# Patient Record
Sex: FEMALE | Race: WHITE | NOT HISPANIC OR LATINO | Employment: FULL TIME | URBAN - METROPOLITAN AREA
[De-identification: names, ages, dates, MRNs, and addresses within clinical notes are randomized per-mention and may not be internally consistent; named-entity substitution may affect disease eponyms.]

---

## 2022-04-01 ENCOUNTER — HOSPITAL ENCOUNTER (OUTPATIENT)
Facility: HOSPITAL | Age: 32
Setting detail: OBSERVATION
Discharge: HOME | End: 2022-04-02
Attending: EMERGENCY MEDICINE | Admitting: EMERGENCY MEDICINE
Payer: COMMERCIAL

## 2022-04-01 DIAGNOSIS — R00.0 SINUS TACHYCARDIA: ICD-10-CM

## 2022-04-01 DIAGNOSIS — F10.20 ALCOHOL USE DISORDER, SEVERE, DEPENDENCE (H): Primary | ICD-10-CM

## 2022-04-01 DIAGNOSIS — F10.930 ALCOHOL WITHDRAWAL SYNDROME WITHOUT COMPLICATION (H): ICD-10-CM

## 2022-04-01 PROBLEM — F41.9 ANXIETY AND DEPRESSION: Status: ACTIVE | Noted: 2022-01-20

## 2022-04-01 PROBLEM — F10.11 HISTORY OF ALCOHOL ABUSE: Status: ACTIVE | Noted: 2021-10-02

## 2022-04-01 PROBLEM — E83.42 HYPOMAGNESEMIA: Status: ACTIVE | Noted: 2021-01-29

## 2022-04-01 PROBLEM — K76.89 HEPATIC DYSFUNCTION: Status: ACTIVE | Noted: 2022-04-01

## 2022-04-01 PROBLEM — F10.931 ALCOHOL WITHDRAWAL DELIRIUM, ACUTE, HYPERACTIVE (H): Status: ACTIVE | Noted: 2021-01-29

## 2022-04-01 PROBLEM — F17.200 TOBACCO USE DISORDER: Status: ACTIVE | Noted: 2022-04-01

## 2022-04-01 PROBLEM — F32.A ANXIETY AND DEPRESSION: Status: ACTIVE | Noted: 2022-01-20

## 2022-04-01 PROBLEM — F32.A DEPRESSION: Status: ACTIVE | Noted: 2021-01-29

## 2022-04-01 PROBLEM — E87.1 HYPONATREMIA: Status: ACTIVE | Noted: 2022-01-20

## 2022-04-01 PROBLEM — F10.932 ALCOHOL WITHDRAWAL SYNDROME WITH PERCEPTUAL DISTURBANCE (H): Status: ACTIVE | Noted: 2022-02-23

## 2022-04-01 PROBLEM — E87.20 METABOLIC ACIDOSIS: Status: ACTIVE | Noted: 2022-01-20

## 2022-04-01 PROBLEM — E86.0 DEHYDRATION: Status: ACTIVE | Noted: 2021-01-02

## 2022-04-01 PROBLEM — F10.239 ALCOHOL DEPENDENCE WITH WITHDRAWAL, UNSPECIFIED (H): Status: ACTIVE | Noted: 2020-10-14

## 2022-04-01 PROBLEM — F39 MOOD DISORDER (H): Status: ACTIVE | Noted: 2021-01-29

## 2022-04-01 LAB
ALBUMIN SERPL-MCNC: 4.5 G/DL (ref 3.5–5)
ALP SERPL-CCNC: 72 U/L (ref 45–120)
ALT SERPL W P-5'-P-CCNC: 16 U/L (ref 0–45)
ANION GAP SERPL CALCULATED.3IONS-SCNC: 17 MMOL/L (ref 5–18)
AST SERPL W P-5'-P-CCNC: 41 U/L (ref 0–40)
ATRIAL RATE - MUSE: 110 BPM
BILIRUB SERPL-MCNC: 1.9 MG/DL (ref 0–1)
BUN SERPL-MCNC: 9 MG/DL (ref 8–22)
CALCIUM SERPL-MCNC: 9.4 MG/DL (ref 8.5–10.5)
CHLORIDE BLD-SCNC: 102 MMOL/L (ref 98–107)
CO2 SERPL-SCNC: 21 MMOL/L (ref 22–31)
CREAT SERPL-MCNC: 0.74 MG/DL (ref 0.6–1.1)
DIASTOLIC BLOOD PRESSURE - MUSE: NORMAL MMHG
ERYTHROCYTE [DISTWIDTH] IN BLOOD BY AUTOMATED COUNT: 12.6 % (ref 10–15)
GFR SERPL CREATININE-BSD FRML MDRD: >90 ML/MIN/1.73M2
GLUCOSE BLD-MCNC: 88 MG/DL (ref 70–125)
HCT VFR BLD AUTO: 41.8 % (ref 35–47)
HGB BLD-MCNC: 14.4 G/DL (ref 11.7–15.7)
HOLD SPECIMEN: NORMAL
HOLD SPECIMEN: NORMAL
INTERPRETATION ECG - MUSE: NORMAL
LIPASE SERPL-CCNC: 40 U/L (ref 0–52)
MAGNESIUM SERPL-MCNC: 1.5 MG/DL (ref 1.8–2.6)
MCH RBC QN AUTO: 31.9 PG (ref 26.5–33)
MCHC RBC AUTO-ENTMCNC: 34.4 G/DL (ref 31.5–36.5)
MCV RBC AUTO: 93 FL (ref 78–100)
P AXIS - MUSE: 74 DEGREES
PHOSPHATE SERPL-MCNC: 1.5 MG/DL (ref 2.5–4.5)
PLATELET # BLD AUTO: 299 10E3/UL (ref 150–450)
POTASSIUM BLD-SCNC: 3.9 MMOL/L (ref 3.5–5)
PR INTERVAL - MUSE: 128 MS
PROT SERPL-MCNC: 7.6 G/DL (ref 6–8)
QRS DURATION - MUSE: 88 MS
QT - MUSE: 362 MS
QTC - MUSE: 489 MS
R AXIS - MUSE: 90 DEGREES
RBC # BLD AUTO: 4.52 10E6/UL (ref 3.8–5.2)
SARS-COV-2 RNA RESP QL NAA+PROBE: NEGATIVE
SODIUM SERPL-SCNC: 140 MMOL/L (ref 136–145)
SYSTOLIC BLOOD PRESSURE - MUSE: NORMAL MMHG
T AXIS - MUSE: 67 DEGREES
VENTRICULAR RATE- MUSE: 110 BPM
WBC # BLD AUTO: 10.8 10E3/UL (ref 4–11)

## 2022-04-01 PROCEDURE — G0378 HOSPITAL OBSERVATION PER HR: HCPCS

## 2022-04-01 PROCEDURE — 96361 HYDRATE IV INFUSION ADD-ON: CPT

## 2022-04-01 PROCEDURE — 93005 ELECTROCARDIOGRAM TRACING: CPT | Performed by: PHYSICIAN ASSISTANT

## 2022-04-01 PROCEDURE — 250N000013 HC RX MED GY IP 250 OP 250 PS 637: Performed by: FAMILY MEDICINE

## 2022-04-01 PROCEDURE — 258N000003 HC RX IP 258 OP 636: Performed by: FAMILY MEDICINE

## 2022-04-01 PROCEDURE — 96376 TX/PRO/DX INJ SAME DRUG ADON: CPT

## 2022-04-01 PROCEDURE — 99214 OFFICE O/P EST MOD 30 MIN: CPT | Mod: 95 | Performed by: FAMILY MEDICINE

## 2022-04-01 PROCEDURE — 87635 SARS-COV-2 COVID-19 AMP PRB: CPT | Performed by: PHYSICIAN ASSISTANT

## 2022-04-01 PROCEDURE — 83690 ASSAY OF LIPASE: CPT | Performed by: PHYSICIAN ASSISTANT

## 2022-04-01 PROCEDURE — 258N000003 HC RX IP 258 OP 636: Performed by: PHYSICIAN ASSISTANT

## 2022-04-01 PROCEDURE — 250N000013 HC RX MED GY IP 250 OP 250 PS 637: Performed by: PHYSICIAN ASSISTANT

## 2022-04-01 PROCEDURE — 99220 PR INITIAL OBSERVATION CARE,LEVEL III: CPT | Performed by: FAMILY MEDICINE

## 2022-04-01 PROCEDURE — 96374 THER/PROPH/DIAG INJ IV PUSH: CPT

## 2022-04-01 PROCEDURE — C9803 HOPD COVID-19 SPEC COLLECT: HCPCS

## 2022-04-01 PROCEDURE — 85027 COMPLETE CBC AUTOMATED: CPT | Performed by: PHYSICIAN ASSISTANT

## 2022-04-01 PROCEDURE — 99285 EMERGENCY DEPT VISIT HI MDM: CPT | Mod: 25

## 2022-04-01 PROCEDURE — 36415 COLL VENOUS BLD VENIPUNCTURE: CPT | Performed by: PHYSICIAN ASSISTANT

## 2022-04-01 PROCEDURE — 80053 COMPREHEN METABOLIC PANEL: CPT | Performed by: PHYSICIAN ASSISTANT

## 2022-04-01 PROCEDURE — 250N000011 HC RX IP 250 OP 636: Performed by: PHYSICIAN ASSISTANT

## 2022-04-01 PROCEDURE — 84100 ASSAY OF PHOSPHORUS: CPT | Performed by: FAMILY MEDICINE

## 2022-04-01 PROCEDURE — 83735 ASSAY OF MAGNESIUM: CPT | Performed by: PHYSICIAN ASSISTANT

## 2022-04-01 RX ORDER — LORAZEPAM 2 MG/ML
1-2 INJECTION INTRAMUSCULAR EVERY 30 MIN PRN
Status: DISCONTINUED | OUTPATIENT
Start: 2022-04-01 | End: 2022-04-01

## 2022-04-01 RX ORDER — ONDANSETRON 2 MG/ML
4 INJECTION INTRAMUSCULAR; INTRAVENOUS EVERY 6 HOURS PRN
Status: DISCONTINUED | OUTPATIENT
Start: 2022-04-01 | End: 2022-04-02 | Stop reason: HOSPADM

## 2022-04-01 RX ORDER — NALTREXONE HYDROCHLORIDE 50 MG/1
50 TABLET, FILM COATED ORAL DAILY
Qty: 30 TABLET | Refills: 0 | Status: SHIPPED | OUTPATIENT
Start: 2022-04-01

## 2022-04-01 RX ORDER — AMOXICILLIN 250 MG
2 CAPSULE ORAL 2 TIMES DAILY PRN
Status: DISCONTINUED | OUTPATIENT
Start: 2022-04-01 | End: 2022-04-02 | Stop reason: HOSPADM

## 2022-04-01 RX ORDER — PROCHLORPERAZINE 25 MG
25 SUPPOSITORY, RECTAL RECTAL EVERY 12 HOURS PRN
Status: DISCONTINUED | OUTPATIENT
Start: 2022-04-01 | End: 2022-04-02 | Stop reason: HOSPADM

## 2022-04-01 RX ORDER — MULTIPLE VITAMINS W/ MINERALS TAB 9MG-400MCG
1 TAB ORAL DAILY
Status: DISCONTINUED | OUTPATIENT
Start: 2022-04-01 | End: 2022-04-01

## 2022-04-01 RX ORDER — GABAPENTIN 300 MG/1
600 CAPSULE ORAL 3 TIMES DAILY
Qty: 180 CAPSULE | Refills: 0 | Status: SHIPPED | OUTPATIENT
Start: 2022-04-01

## 2022-04-01 RX ORDER — GABAPENTIN 300 MG/1
300 CAPSULE ORAL EVERY 8 HOURS
Status: DISCONTINUED | OUTPATIENT
Start: 2022-04-06 | End: 2022-04-01

## 2022-04-01 RX ORDER — HALOPERIDOL 5 MG/ML
2.5-5 INJECTION INTRAMUSCULAR EVERY 6 HOURS PRN
Status: DISCONTINUED | OUTPATIENT
Start: 2022-04-01 | End: 2022-04-02 | Stop reason: HOSPADM

## 2022-04-01 RX ORDER — POLYETHYLENE GLYCOL 3350 17 G/17G
17 POWDER, FOR SOLUTION ORAL DAILY
Status: DISCONTINUED | OUTPATIENT
Start: 2022-04-01 | End: 2022-04-02 | Stop reason: HOSPADM

## 2022-04-01 RX ORDER — AMOXICILLIN 250 MG
1 CAPSULE ORAL 2 TIMES DAILY PRN
Status: DISCONTINUED | OUTPATIENT
Start: 2022-04-01 | End: 2022-04-02 | Stop reason: HOSPADM

## 2022-04-01 RX ORDER — MULTIVITAMIN,THERAPEUTIC
1 TABLET ORAL ONCE
Status: COMPLETED | OUTPATIENT
Start: 2022-04-01 | End: 2022-04-01

## 2022-04-01 RX ORDER — MULTIPLE VITAMINS W/ MINERALS TAB 9MG-400MCG
1 TAB ORAL DAILY
Status: DISCONTINUED | OUTPATIENT
Start: 2022-04-01 | End: 2022-04-02 | Stop reason: HOSPADM

## 2022-04-01 RX ORDER — FLUMAZENIL 0.1 MG/ML
0.2 INJECTION, SOLUTION INTRAVENOUS
Status: DISCONTINUED | OUTPATIENT
Start: 2022-04-01 | End: 2022-04-02 | Stop reason: HOSPADM

## 2022-04-01 RX ORDER — HYDROXYZINE HYDROCHLORIDE 25 MG/1
25 TABLET, FILM COATED ORAL ONCE
Status: COMPLETED | OUTPATIENT
Start: 2022-04-01 | End: 2022-04-01

## 2022-04-01 RX ORDER — HYDROXYZINE PAMOATE 25 MG/1
25 CAPSULE ORAL 3 TIMES DAILY PRN
COMMUNITY

## 2022-04-01 RX ORDER — ACETAMINOPHEN 325 MG/1
650 TABLET ORAL EVERY 6 HOURS PRN
Status: DISCONTINUED | OUTPATIENT
Start: 2022-04-01 | End: 2022-04-02 | Stop reason: HOSPADM

## 2022-04-01 RX ORDER — DIAZEPAM 10 MG/2ML
5-10 INJECTION, SOLUTION INTRAMUSCULAR; INTRAVENOUS EVERY 30 MIN PRN
Status: DISCONTINUED | OUTPATIENT
Start: 2022-04-01 | End: 2022-04-02 | Stop reason: HOSPADM

## 2022-04-01 RX ORDER — LORAZEPAM 2 MG/ML
1 INJECTION INTRAMUSCULAR ONCE
Status: COMPLETED | OUTPATIENT
Start: 2022-04-01 | End: 2022-04-01

## 2022-04-01 RX ORDER — DIAZEPAM 5 MG
10 TABLET ORAL EVERY 30 MIN PRN
Status: DISCONTINUED | OUTPATIENT
Start: 2022-04-01 | End: 2022-04-02 | Stop reason: HOSPADM

## 2022-04-01 RX ORDER — FOLIC ACID 1 MG/1
1 TABLET ORAL DAILY
Status: DISCONTINUED | OUTPATIENT
Start: 2022-04-01 | End: 2022-04-02 | Stop reason: HOSPADM

## 2022-04-01 RX ORDER — LORAZEPAM 2 MG/ML
1 INJECTION INTRAMUSCULAR ONCE
Status: DISCONTINUED | OUTPATIENT
Start: 2022-04-01 | End: 2022-04-01

## 2022-04-01 RX ORDER — ACETAMINOPHEN 650 MG/1
650 SUPPOSITORY RECTAL EVERY 6 HOURS PRN
Status: DISCONTINUED | OUTPATIENT
Start: 2022-04-01 | End: 2022-04-02 | Stop reason: HOSPADM

## 2022-04-01 RX ORDER — MULTIPLE VITAMINS W/ MINERALS TAB 9MG-400MCG
1 TAB ORAL DAILY
COMMUNITY

## 2022-04-01 RX ORDER — HYDROXYZINE PAMOATE 25 MG/1
25 CAPSULE ORAL 3 TIMES DAILY PRN
Status: DISCONTINUED | OUTPATIENT
Start: 2022-04-01 | End: 2022-04-02 | Stop reason: HOSPADM

## 2022-04-01 RX ORDER — IBUPROFEN 600 MG/1
600 TABLET, FILM COATED ORAL EVERY 6 HOURS PRN
Status: DISCONTINUED | OUTPATIENT
Start: 2022-04-01 | End: 2022-04-02 | Stop reason: HOSPADM

## 2022-04-01 RX ORDER — PROCHLORPERAZINE MALEATE 10 MG
10 TABLET ORAL EVERY 6 HOURS PRN
Status: DISCONTINUED | OUTPATIENT
Start: 2022-04-01 | End: 2022-04-02 | Stop reason: HOSPADM

## 2022-04-01 RX ORDER — NALTREXONE HYDROCHLORIDE 50 MG/1
50 TABLET, FILM COATED ORAL DAILY
Status: ON HOLD | COMMUNITY
End: 2022-04-01

## 2022-04-01 RX ORDER — GABAPENTIN 100 MG/1
100 CAPSULE ORAL EVERY 8 HOURS
Status: DISCONTINUED | OUTPATIENT
Start: 2022-04-08 | End: 2022-04-01

## 2022-04-01 RX ORDER — LORAZEPAM 1 MG/1
1-2 TABLET ORAL EVERY 30 MIN PRN
Status: DISCONTINUED | OUTPATIENT
Start: 2022-04-01 | End: 2022-04-01

## 2022-04-01 RX ORDER — ONDANSETRON 4 MG/1
4 TABLET, ORALLY DISINTEGRATING ORAL EVERY 6 HOURS PRN
Status: DISCONTINUED | OUTPATIENT
Start: 2022-04-01 | End: 2022-04-02 | Stop reason: HOSPADM

## 2022-04-01 RX ORDER — MAGNESIUM OXIDE 400 MG/1
800 TABLET ORAL ONCE
Status: COMPLETED | OUTPATIENT
Start: 2022-04-01 | End: 2022-04-01

## 2022-04-01 RX ORDER — OLANZAPINE 5 MG/1
5-10 TABLET, ORALLY DISINTEGRATING ORAL EVERY 6 HOURS PRN
Status: DISCONTINUED | OUTPATIENT
Start: 2022-04-01 | End: 2022-04-02 | Stop reason: HOSPADM

## 2022-04-01 RX ORDER — NALTREXONE HYDROCHLORIDE 50 MG/1
50 TABLET, FILM COATED ORAL DAILY
Status: DISCONTINUED | OUTPATIENT
Start: 2022-04-03 | End: 2022-04-02 | Stop reason: HOSPADM

## 2022-04-01 RX ORDER — SODIUM CHLORIDE 9 MG/ML
INJECTION, SOLUTION INTRAVENOUS CONTINUOUS
Status: ACTIVE | OUTPATIENT
Start: 2022-04-01 | End: 2022-04-02

## 2022-04-01 RX ORDER — ESCITALOPRAM OXALATE 10 MG/1
10 TABLET ORAL DAILY
Status: DISCONTINUED | OUTPATIENT
Start: 2022-04-01 | End: 2022-04-02 | Stop reason: HOSPADM

## 2022-04-01 RX ORDER — GABAPENTIN 300 MG/1
600 CAPSULE ORAL 3 TIMES DAILY
Status: DISCONTINUED | OUTPATIENT
Start: 2022-04-01 | End: 2022-04-02 | Stop reason: HOSPADM

## 2022-04-01 RX ORDER — ESCITALOPRAM OXALATE 10 MG/1
10 TABLET ORAL DAILY
COMMUNITY

## 2022-04-01 RX ORDER — FOLIC ACID 1 MG/1
1 TABLET ORAL ONCE
Status: COMPLETED | OUTPATIENT
Start: 2022-04-01 | End: 2022-04-01

## 2022-04-01 RX ADMIN — MULTIPLE VITAMINS W/ MINERALS TAB 1 TABLET: TAB at 16:44

## 2022-04-01 RX ADMIN — FOLIC ACID 1 MG: 1 TABLET ORAL at 09:23

## 2022-04-01 RX ADMIN — Medication 800 MG: at 09:24

## 2022-04-01 RX ADMIN — HYDROXYZINE HYDROCHLORIDE 25 MG: 25 TABLET ORAL at 09:59

## 2022-04-01 RX ADMIN — THERA TABS 1 TABLET: TAB at 09:24

## 2022-04-01 RX ADMIN — THIAMINE HCL TAB 100 MG 100 MG: 100 TAB at 09:24

## 2022-04-01 RX ADMIN — LORAZEPAM 1 MG: 2 INJECTION INTRAMUSCULAR; INTRAVENOUS at 09:19

## 2022-04-01 RX ADMIN — SODIUM CHLORIDE: 9 INJECTION, SOLUTION INTRAVENOUS at 16:50

## 2022-04-01 RX ADMIN — FOLIC ACID 1 MG: 1 TABLET ORAL at 16:44

## 2022-04-01 RX ADMIN — Medication 100 MG: at 16:44

## 2022-04-01 RX ADMIN — LORAZEPAM 1 MG: 2 INJECTION INTRAMUSCULAR; INTRAVENOUS at 12:22

## 2022-04-01 RX ADMIN — LORAZEPAM 1 MG: 2 INJECTION INTRAMUSCULAR; INTRAVENOUS at 09:57

## 2022-04-01 RX ADMIN — SODIUM CHLORIDE 1000 ML: 9 INJECTION, SOLUTION INTRAVENOUS at 09:22

## 2022-04-01 RX ADMIN — GABAPENTIN 600 MG: 300 CAPSULE ORAL at 21:04

## 2022-04-01 RX ADMIN — SODIUM CHLORIDE 1000 ML: 9 INJECTION, SOLUTION INTRAVENOUS at 10:50

## 2022-04-01 RX ADMIN — ESCITALOPRAM OXALATE 10 MG: 10 TABLET, FILM COATED ORAL at 16:44

## 2022-04-01 ASSESSMENT — ENCOUNTER SYMPTOMS
NAUSEA: 1
VOMITING: 0
NECK PAIN: 0
HEADACHES: 0
WEAKNESS: 0
SHORTNESS OF BREATH: 0
CHILLS: 0
NUMBNESS: 1
ABDOMINAL PAIN: 0
BACK PAIN: 0
DIZZINESS: 0
FEVER: 0
COUGH: 0
TREMORS: 1

## 2022-04-01 ASSESSMENT — PATIENT HEALTH QUESTIONNAIRE - PHQ9: SUM OF ALL RESPONSES TO PHQ9 QUESTIONS 1 & 2: 1

## 2022-04-01 NOTE — ED NOTES
"[facility] ED Handoff Report    ED Chief Complaint: The patient arrived via EMS after having symtoms of alcohol [place]. The patient states that she had severe cramping and burning in her hands and feet.    ED Diagnosis:  (F10.230) Alcohol withdrawal syndrome without complication (H)  Comment: The patient reports feeling very anxious and has notable hand and feet numbness and tingling.  Plan: The patient will admit for observation    (R00.0) Sinus tachycardia  Comment: range 106 up to 130  Plan: IV hydration and ativan given       PMH:  No past medical history on file.     Code Status:  No Order     Falls Risk: No Band: Not applicable    Current Living Situation/Residence: lives alone and lives in a house     Elimination Status: Continent: No     Activity Level: Independent    Patients Preferred Language:  English     Needed: No    Vital Signs:  /73   Pulse 111   Temp 98  F (36.7  C) (Oral)   Resp 22   Ht 1.626 m (5' 4\")   Wt 56.7 kg (125 lb)   LMP 03/29/2022   SpO2 97%   BMI 21.46 kg/m       Cardiac Rhythm: STT    Pain Score: 0/10    Is the Patient Confused:  No    Last Food or Drink: 04/01/22 at     Focused Assessment:  The patient has is alert and oriented x 3, states that she feels much better than when she arrived. Skin is pink, warm and moist. The pt Lung sounds are clear, Heart rate varies. Denies nausea, vomiting or diaphoresis. The pt denies pain at this time. Patient is able to now open her hands and states that she does not feel the numbness and tingling that she arrived in the ER with those symptoms.    Tests Performed: Done: Labs    Treatments Provided:  The patient received hydration and anti-anxiety medication, See CIWA    Family Dynamics/Concerns: No    Family Updated On Visitor Policy: Yes    Plan of Care Communicated to Family: No , the pt states that there is no reason to contact her mom at this time, the patient notified a " close friend.    Who Was Updated about Plan of Care: Patient    Belongings Checklist Done and Signed by Patient: Yes    Medications sent with patient: None    Covid: asymptomatic , negative    Additional Information: The patient reports that she is feeling much better at this time.     RN: Raina Cortez  4/1/2022 1:19 PM

## 2022-04-01 NOTE — ED NOTES
D: The patient CIWA reassessment is 14, she reports that she is still feeling very anxious. The patients arms and legs are still trembling. She remains tachycardic although states she has improved, still feels very anxious.  A: PA notified  R: Another dose of ativan administered, pt reports she is feeling much better. Will continue to monitor.

## 2022-04-01 NOTE — ED PROVIDER NOTES
Emergency Department Encounter   NAME: Diana Love ; AGE: 32 year old female ; YOB: 1990 ; MRN: [id number] ; PCP: No primary care provider on file.   ED PROVIDER: Narcisa Berrios PA-C    Evaluation Date & Time:   4/1/2022  8:56 AM    CHIEF COMPLAINT:  Alcohol Problem      Impression and Plan   MDM: Diana Love is a 32 year old female with a pertinent history of alcohol dependence and withdrawal, anxiety and depression, dehydration, hyponatremia, hypomagnesemia, who presents to the ED by EMS for evaluation of alcohol withdrawal.  The patient presents to the emergency department for evaluation of numbness to all of her fingers and toes, nausea, anxiety, tachycardia.  She has experienced alcohol withdrawal in the past, and has been admitted for this previously.  She has been binge drinking, consuming half a liter of vodka per day for the past 3 days with her last drink being around 8 PM yesterday evening.  She developed the symptoms around 830 this morning.  Upon arrival to the emergency department, she is hemodynamically stable though tachycardic to the 120s.  She appears very anxious, and is having an active panic attack.  Mild tremors noted.  Nursing staff calculated CIWA score to 18, clinically she is consistent with alcohol withdrawal along with panic attack.  We discussed plan at this time for IV fluids, thiamine, folic acid, and multivitamin ordered.  Basic labs and EKG ordered to further assess.    EKG shows sinus tachycardia with rightward axis.  Her magnesium did return at 1.5 -oral magnesium ordered.  No other concerning metabolic derangements.  Normal renal function.  No anemia.  No significant change in her LFTs or lipase.  Patient did require several episodes of benzodiazepines.  She is appearing significantly improved and her CIWA score is down to 4, however she is still persistently tachycardic in the 120s.  Patient has concerns for dehydration another liter of IV fluids  was administered though heart rate is unchanged.  Given this, plan to admit patient to observation med telemetry for continued monitoring and treatment of her opiate withdrawal.  Patient is comfortable with this plan.  Dr. Whyte has accepted the patient for admission.    ED COURSE:   I met and introduced myself to the patient. I gathered initial history and performed my physical exam. We discussed plan for initial workup.   9:14 AM I have staffed the patient with Dr. Gonzales, ED MD, who has evaluated the patient and agrees with all aspects of today's care.   10:40 AM Reassessed the patient - feels significantly improved though is still tachycardic to the 120's. Plan for another liter of fluids.   11:30 AM Rechecked the patient - she is sleeping though still tachycardic in the 117's.  11:36 AM Paged for admission.   12:06 PM I spoke with Dr. Whyte, who accepts the patient for obs med tele admission.   12:06 PM Rechecked the patient and discussed plan for admission.       At the conclusion of the encounter I discussed the results of all the tests and the disposition. The questions were answered. The patient or family acknowledged understanding and was agreeable with the care plan.    FINAL IMPRESSION:    ICD-10-CM    1. Alcohol withdrawal syndrome without complication (H)  F10.230    2. Sinus tachycardia  R00.0          MEDICATIONS GIVEN IN THE EMERGENCY DEPARTMENT:  Medications   LORazepam (ATIVAN) injection 1 mg (1 mg Intravenous Not Given 4/1/22 1023)   0.9% sodium chloride BOLUS (0 mLs Intravenous Stopped 4/1/22 1043)   LORazepam (ATIVAN) injection 1 mg (1 mg Intravenous Given 4/1/22 0919)   multivitamin, therapeutic (THERA-VIT) tablet 1 tablet (1 tablet Oral Given 4/1/22 0924)   folic acid (FOLVITE) tablet 1 mg (1 mg Oral Given 4/1/22 0923)   thiamine (B-1) tablet 100 mg (100 mg Oral Given 4/1/22 0924)   magnesium oxide (MAG-OX) tablet 800 mg (800 mg Oral Given 4/1/22 0924)   hydrOXYzine (ATARAX) tablet 25 mg  (25 mg Oral Given 4/1/22 0973)   LORazepam (ATIVAN) injection 1 mg (1 mg Intravenous Given 4/1/22 0957)   0.9% sodium chloride BOLUS (0 mLs Intravenous Stopped 4/1/22 1205)   LORazepam (ATIVAN) injection 1 mg (1 mg Intravenous Given 4/1/22 1222)         NEW PRESCRIPTIONS STARTED AT TODAY'S ED VISIT:  New Prescriptions    No medications on file         HPI   Patient information was obtained from: Patient and EMS   Use of Intrepreter: N/A      Diana Love is a 32 year old female with a pertinent history of alcohol dependence and withdrawal, anxiety and depression, dehydration, hyponatremia, hypomagnesemia, who presents to the ED by EMS for evaluation of alcohol withdrawal.     Per EMS, patient had pulled over to the side of the road with sudden onset anxiety as well as tingling to all of her fingers and toes.  A performed a thorough neurologic exam on her and did not activate stroke code.  They performed a 12-lead and she was found to be in sinus tachycardia with a heart rate in the 120s.    Per patient, she states that she has been drinking half a liter of vodka for the past 3 days with her last drink being around 8 PM last night.  She woke up this morning feeling okay, however around 830 when she was driving, she had sudden onset worsening anxiety, nausea, was experiencing numbness of all of her fingers and toes prompting her to call EMS.  She has been admitted for alcohol withdrawal in the past and has underwent outpatient treatment.  She does intermittently smoke some marijuana denies any other illicit drug use.  She feels nauseated, however has not had any vomiting at this time.  No pain currently.  Denies chance of pregnancy.    REVIEW OF SYSTEMS:  Review of Systems   Constitutional: Negative for chills and fever.   Eyes: Negative for visual disturbance.   Respiratory: Negative for cough and shortness of breath.    Cardiovascular: Negative for chest pain.   Gastrointestinal: Positive for nausea. Negative  for abdominal pain and vomiting.   Musculoskeletal: Negative for back pain and neck pain.   Neurological: Positive for tremors and numbness. Negative for dizziness, weakness and headaches.   All other systems reviewed and are negative.        Medical History     No past medical history on file.    No past surgical history on file.    No family history on file.    Social History     Tobacco Use     Smoking status: Not on file     Smokeless tobacco: Not on file   Substance Use Topics     Alcohol use: Not on file     Drug use: Not on file       escitalopram (LEXAPRO) 10 MG tablet  hydrOXYzine (VISTARIL) 25 MG capsule  multivitamin w/minerals (THERA-VIT-M) tablet  naltrexone (DEPADE/REVIA) 50 MG tablet          Physical Exam     First Vitals:  Patient Vitals for the past 24 hrs:   BP Temp Temp src Pulse Resp SpO2 Height Weight   04/01/22 1338 -- -- -- 110 23 97 % -- --   04/01/22 1315 127/73 -- -- 111 22 97 % -- --   04/01/22 1300 124/74 -- -- 113 22 97 % -- --   04/01/22 1245 117/75 -- -- (!) 121 22 97 % -- --   04/01/22 1230 126/75 -- -- (!) 125 17 97 % -- --   04/01/22 1215 119/73 -- -- (!) 129 18 99 % -- --   04/01/22 1200 121/65 -- -- 113 23 97 % -- --   04/01/22 1145 132/72 -- -- 117 24 98 % -- --   04/01/22 1130 126/70 -- -- 119 22 99 % -- --   04/01/22 1115 126/72 -- -- 119 21 100 % -- --   04/01/22 1100 131/71 -- -- 113 19 99 % -- --   04/01/22 1056 -- -- -- 119 16 100 % -- --   04/01/22 1045 (!) 147/75 -- -- 120 18 99 % -- --   04/01/22 1037 -- -- -- (!) 121 26 99 % -- --   04/01/22 1030 (!) 145/88 -- -- (!) 121 (!) 35 99 % -- --   04/01/22 1015 139/82 -- -- 119 24 100 % -- --   04/01/22 1001 -- -- -- 120 19 100 % -- --   04/01/22 1000 136/76 -- -- -- -- -- -- --   04/01/22 0945 131/87 -- -- 113 22 100 % -- --   04/01/22 0937 -- -- -- 112 26 100 % -- --   04/01/22 0930 135/84 -- -- 111 21 100 % -- --   04/01/22 0915 127/85 -- -- 108 9 100 % -- --   04/01/22 0900 133/78 98  F (36.7  C) Oral 114 10 100 % 1.626 m  "(5' 4\") 56.7 kg (125 lb)   04/01/22 0858 130/87 -- -- (!) 122 12 100 % -- --         PHYSICAL EXAM:   Physical Exam  Vitals and nursing note reviewed.   Constitutional:       Appearance: She is not diaphoretic.      Comments: Disheveled.  Very anxious.  No obvious hallucinations.   HENT:      Head: Normocephalic and atraumatic.      Mouth/Throat:      Mouth: Mucous membranes are dry.   Eyes:      Extraocular Movements: Extraocular movements intact.      Conjunctiva/sclera: Conjunctivae normal.      Pupils: Pupils are equal, round, and reactive to light.   Cardiovascular:      Rate and Rhythm: Regular rhythm. Tachycardia present.      Pulses: Normal pulses.      Heart sounds: Normal heart sounds.   Pulmonary:      Effort: Pulmonary effort is normal.      Breath sounds: Normal breath sounds.   Abdominal:      General: Abdomen is flat. Bowel sounds are normal. There is no distension.      Palpations: Abdomen is soft.      Tenderness: There is no abdominal tenderness. There is no guarding or rebound.   Skin:     General: Skin is warm and dry.      Capillary Refill: Capillary refill takes less than 2 seconds.   Neurological:      Mental Status: She is alert and oriented to person, place, and time.      Comments: Subjective decreased sensation to the tips of fingers and toes. Moving all extremities. Strength intact. CN 3-12 intact. Mild resting tremor.              Results     LAB:  All pertinent labs reviewed and interpreted  Labs Ordered and Resulted from Time of ED Arrival to Time of ED Departure   COMPREHENSIVE METABOLIC PANEL - Abnormal       Result Value    Sodium 140      Potassium 3.9      Chloride 102      Carbon Dioxide (CO2) 21 (*)     Anion Gap 17      Urea Nitrogen 9      Creatinine 0.74      Calcium 9.4      Glucose 88      Alkaline Phosphatase 72      AST 41 (*)     ALT 16      Protein Total 7.6      Albumin 4.5      Bilirubin Total 1.9 (*)     GFR Estimate >90     MAGNESIUM - Abnormal    Magnesium 1.5 (*)  "   CBC WITH PLATELETS - Normal    WBC Count 10.8      RBC Count 4.52      Hemoglobin 14.4      Hematocrit 41.8      MCV 93      MCH 31.9      MCHC 34.4      RDW 12.6      Platelet Count 299     LIPASE - Normal    Lipase 40     COVID-19 VIRUS (CORONAVIRUS) BY PCR - Normal    SARS CoV2 PCR Negative         RADIOLOGY:  No orders to display         ECG:  Performed at: 4/1/2022    Impression: Sinus tachycardia.  Rightward axis.    Rate: 110 bpm  Rhythm: Sinus tachycardia  Axis: 74 90 67  RI Interval: 128  QRS Interval: 88  QTc Interval: 49  ST Changes: None  Comparison: None    EKG results reviewed and interpreted by Dr. Gonzales, ED MD.         Narcisa Berrios PA-C   Emergency Medicine   [facility] EMERGENCY DEPARTMENT       Narcisa Berrios PA-C  04/01/22 2645

## 2022-04-01 NOTE — ED NOTES
RN gave report to OSMANY Lopez at this time; bed is still dirty will await cleaning and than send pt to Observation unit.

## 2022-04-01 NOTE — CONSULTS
Addiction Medicine Consultation    Diana Love, 1990, [id number]       Tele-Visit Details    Type of service:  Video Visit  Video Start Time (time video started): 1541  Video End Time (time video stopped): 1607  Originating Location (pt. Location): Patient's room  Distant Location (provider location): Private home office  Reason for Televisit: COVID-19  Mode of Communication:  Video Conference via polycom  Physician has received verbal consent for a video visit from the patient? Yes    Assessment and Plan:     Active Problems:    Alcohol use disorder, severe, dependence (H)    Hypomagnesemia    Sinus tachycardia    Alcohol withdrawal syndrome without complication (H)    32 female with a history or severe alcohol use disorder who presented in alcohol withdrawal.  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  She is more comfortable now after receiving 3 mg of lorazepam total today.  Liver function is fairly good and she should be able to tolerate diazepam.  She has had good success with naltrexone in the past and in fact did not drink while on it for a month so I will plan on restarting that on Sunday.    Recommendations:  1. CIWA with diazepam instead of lorazepam (order placed to switch to diazepam)  2. Increase gabapentin to 600mg tid while in alcohol withdrawal.   Continue this dose at discharge (prescription sent to outpatient pharmacy.)  3. Restart naltrexone 50 mg at noon on 04/03/2022.  4. With a history of hallucinations, may need antipsychotic but hopefully diazepam will prevent that.  5. If tachycardia returns, you may consider a dose of clonidine.  I have not ordered this but it can be considered for autonomic effects of alcohol withdrawal.  6. Patient agrees to follow-up with the outpatient addiction medicine clinic at [facility].  A referral has been placed in the discharge navigator.  I do recommend that an appointment be scheduled prior to discharge if at all  possible.  7. Rule 25 (ordered)    Please note that there is no addiction medicine coverage over the weekend at this time.  If patient is still hospitalized next week, there is minimal coverage but the plan will be to at least follow peripherally.    Chief Complaint:  Alcohol withdrawal and panic     HPI:    Diana Love is a 32 year old old female with a past medical history significant for severe alcohol use disorder, anxiety, depression, alcoholic hallucinosis who presented to [facility] for panic and alcohol withdrawal.    Substance of choice: Alcohol    Patient reports that she occasionally smokes cannabis and typically does this only when she is not drinking and vice versa.  She denies any other past or present substance use and has never used a drug IV.      ETOH:  Amount/frequency: 0.5-1 liter vodka daily in a binge drinking pattern  Last use: 8 PM on 3/31/2022  History of seizures: Denies  History of DTs: Unclear although, patient does describe hallucinations with withdrawal  Use of alcohol pharmacotherapies: Patient was previously on naltrexone for a month and did not realize that she actually had a refill for it.  During that entire month, she did remain sober and she is interested in going back on naltrexone which is quite reasonable.  She has never been on gabapentin or other medications for alcohol use disorder but is interested.      She has been to an outpatient treatment for 12 weeks in June 2020 following a DUI and attended AA.  She would be interested in going to treatment at this point.    Currently has very mild tremor, some anxiety but greatly improved and no nausea or vomiting.  No sweats, chills, shakes or fevers.  Her heart does not feel like it is racing anymore and she has no chest pain, palpitations, shortness of breath or cough.    Medical History  Alcohol use disorder  Anxiety disorder  Depression  Hyponatremia  Hypomagnesemia    Surgical History  No previous surgeries    Social  "History  Current living situation: Lives in [place] outside of [place] with her mother who is sober for over 20 years  Employment: Works as a  at [employer]  Single  No children    Family History  Reviewed    Mother and father both have alcohol use disorder, brothers, uncles and aunts.  Her mother and uncle have been sober for over 20 years and her brother has been sober for 2 years.  She feels like she has a supportive family    Prior to Admission Medications   Medications Prior to Admission   Medication Sig Dispense Refill Last Dose     escitalopram (LEXAPRO) 10 MG tablet Take 10 mg by mouth daily   3/31/2022 at Unknown time     hydrOXYzine (VISTARIL) 25 MG capsule Take 25 mg by mouth 3 times daily as needed for anxiety        multivitamin w/minerals (THERA-VIT-M) tablet Take 1 tablet by mouth daily   Past Week at Unknown time     naltrexone (DEPADE/REVIA) 50 MG tablet Take 50 mg by mouth daily   3/27/2022       Allergies  No Known Allergies       Review of Systems:    A 10 point review of systems was completed and negative apart from that which is mentioned in the HPI.      Physical Exam:    /77 (BP Location: Left arm)   Pulse 109   Temp 99  F (37.2  C) (Oral)   Resp 18   Ht 1.626 m (5' 4\")   Wt 56.2 kg (124 lb)   LMP 03/29/2022   SpO2 99%   BMI 21.28 kg/m      Standard physical exam not performed today since this encounter is via telehealth during the COVID-19 pandemic.  The exams performed by previous providers are personally reviewed in the chart.      General appearance   Gen: awake, alert, and oriented   Sitting up in bed, eating  Dermatologic   No rash. No piloerection or diaphoresis. No jaundice  HEENT   EOMI.    No yawning  Pulmonary   No respiratory distress. No cough noted.  Neurologic   Oriented to person, place, time and situation   No Tremor noted  Speech is normal  Insight and judgment is good  Mood is described as anxious, affect is slightly anxious      Results:    Lab " Results personally reviewed   Bili 1.9, ALT 16, AST 41, magnesium 1.5 otherwise CMP within normal limits.  CBC within normal limits     personally reviewed and shows:  Prescriptions  Total Prescriptions: 1    Total Private Pay: 0    Fill Date ID   Written Drug Qty Days Prescriber Rx # Pharmacy Refill   Daily Dose* Pymt Type      10/09/2021  1   10/09/2021  Chlordiazepoxide 5 MG Capsule    25.00  7 [facility] [place]   [id number]   [facility] ([id number])   0/0  0.71 LME            Cece Mortensen MD  Addiction Medicine

## 2022-04-01 NOTE — ED TRIAGE NOTES
The patient arrived via EMS [facility] for sx's of etoh withdrawal that came on  While driving, the patients hands and feet became numb,. The pt reported this has happened to her in the past. The pt states she drink 1/2 L of vodka daily.

## 2022-04-01 NOTE — ED PROVIDER NOTES
Emergency Department Midlevel Supervisory Note     I personally saw the patient and performed a substantive portion of the visit including all aspects of the medical decision making.    ED Course:  9:21 AM Che Berrios PA-C staffed patient with me. I agree with their assessment and plan of management, and I will see the patient.  9:23 AM I met with the patient to introduce myself, gather additional history, perform my initial exam, and discuss the plan.     Brief HPI:     Diana Love is a 32 year old female who presents for evaluation of alcohol withdrawal. Patient reports she has been drinking 0.5 L of vodka for the past 3 days with her last drink at 2000 last night. Patient woke up this morning feeling fine but at 0830 she experienced a sudden onset of worsening anxiety, nausea, and numbness to all of her fingers and toes while she was driving. History of admission for alcohol withdrawal but denies a history of withdrawal seizures.    I, Janice Roman, am serving as a scribe to document services personally performed by Taco Gonzales DO, based on my observations and the provider's statements to me.   I, Taco Gonzales DO, attest that Janice Roman was acting in a scribe capacity, has observed my performance of the services and has documented them in accordance with my direction.    Brief Physical Exam:  General Appearance: Well-appearing, well-nourished, no acute distress. Anxious apperaing  Head:  Normocephalic, without obvious abnormality, atraumatic  Eyes:  PERRL, conjunctiva/corneas clear, EOM's intact,  ENT:  Lips, mucosa, and tongue normal, membranes are moist without pallor  Neck:  Normal ROM, symmetrical, trachea midline    Cardio:  Tachycardic, regular rhythm, no murmur, rub or gallop, 2+ pulses symmetric in all extremities  Pulm:  Clear to auscultation bilaterally, respirations unlabored,   Abdomen:  Soft, non-tender, no rebound or guarding.  Musculoskeletal: Full ROM, no edema, no cyanosis, good ROM  of major joints  Integument:  Warm, Dry, No erythema, No rash.    Neurologic:  Alert & oriented.  No focal deficits appreciated.  Ambulatory.Tremulous.  Psychiatric:  Affect normal, Judgment normal, Mood normal.       MDM:  32-year-old female with known history of alcoholism presenting to the emergency department what appears to be acute alcohol withdrawals.  There is no obvious infection.  Ativan did improve her symptoms some, however she still remains significantly symptomatic, and therefore we did decide to admit for alcohol withdrawal.  This time I do not believe there is additional intervention necessary in the emergency department.  Patient was agreeable with the plan.       1. Alcohol withdrawal syndrome without complication (H)    2. Sinus tachycardia        Labs and Imaging:  Results for orders placed or performed during the hospital encounter of 04/01/22   CBC (+ platelets, no diff)   Result Value Ref Range    WBC Count 10.8 4.0 - 11.0 10e3/uL    RBC Count 4.52 3.80 - 5.20 10e6/uL    Hemoglobin 14.4 11.7 - 15.7 g/dL    Hematocrit 41.8 35.0 - 47.0 %    MCV 93 78 - 100 fL    MCH 31.9 26.5 - 33.0 pg    MCHC 34.4 31.5 - 36.5 g/dL    RDW 12.6 10.0 - 15.0 %    Platelet Count 299 150 - 450 10e3/uL   Comprehensive metabolic panel   Result Value Ref Range    Sodium 140 136 - 145 mmol/L    Potassium 3.9 3.5 - 5.0 mmol/L    Chloride 102 98 - 107 mmol/L    Carbon Dioxide (CO2) 21 (L) 22 - 31 mmol/L    Anion Gap 17 5 - 18 mmol/L    Urea Nitrogen 9 8 - 22 mg/dL    Creatinine 0.74 0.60 - 1.10 mg/dL    Calcium 9.4 8.5 - 10.5 mg/dL    Glucose 88 70 - 125 mg/dL    Alkaline Phosphatase 72 45 - 120 U/L    AST 41 (H) 0 - 40 U/L    ALT 16 0 - 45 U/L    Protein Total 7.6 6.0 - 8.0 g/dL    Albumin 4.5 3.5 - 5.0 g/dL    Bilirubin Total 1.9 (H) 0.0 - 1.0 mg/dL    GFR Estimate >90 >60 mL/min/1.73m2   Result Value Ref Range    Lipase 40 0 - 52 U/L   Result Value Ref Range    Magnesium 1.5 (L) 1.8 - 2.6 mg/dL   Extra Blue Top Tube    Result Value Ref Range    Hold Specimen JIC    Extra Red Top Tube   Result Value Ref Range    Hold Specimen JIC      I have reviewed the relevant laboratory and radiology studies      DO ELDON Hathaway [facility] EMERGENCY DEPARTMENT  [address] [place] [id number] [phone]       Taco Gonazles DO  04/01/22 122

## 2022-04-01 NOTE — ED NOTES
D: The patient arrived via EMS WBL. The patient called ems as she was driving on [address] and started to feel numbness and tingling in her hands and feet. She pulled over and called 911. The patient stated that she felt she was in alcohol withdrawal. The pt stated that she had been admitted to the hospital in the past for these same symptoms. The patient stated no seizure history. The patient drink 1/2 L of vodka daily, her last drink was 3/31/2022 evening. The pt BS is 100. 12 lead ekg wbl, nka, .    Patient reports that she has these symptoms when she is in withdrawal .  The patient presents with tremors post hyperventilating appearance hands curled inward. The patient reminded to breathe slower and deeper. The patient has no previous medical history. The pt Mom would be the person of contact.    R: IV placed, on monitor ST noted, EKG labs and blood collected.

## 2022-04-01 NOTE — H&P
Admission History & Physical  Diana Love, 1990, [id number]    [facility]  [unfilled]  No Ref-Primary, Physician, None   Code Status:  Full Code     Extended Emergency Contact Information  Primary Emergency Contact: BEVERLY LOVE  Mobile Phone: [phone]  Relation: Mother     Assessment and Plan:   32-year-old female with alcohol use disorder, mood disorder presents with acute alcohol withdrawal, tachycardia, hypomagnesemia.      Active Problems:    Alcohol use disorder, severe, dependence (H)    Mood disorder (H)    Hypomagnesemia    Sinus tachycardia    Alcohol withdrawal syndrome without complication (H)    Hepatic dysfunction    Tobacco use disorder    Present on Admission:    Sinus tachycardia    Alcohol withdrawal syndrome without complication (H)    Alcohol use disorder, severe, dependence (H)    Hypomagnesemia      Alcohol abuse disorder with acute alcohol withdrawal    -Patient endorses she has been drinking 1/2 L of vodka on a daily basis for the past 3 days.  Last drink was the evening of 3/31 and she has been feeling anxious, tremulous, nauseated on 4/1.  Patient has gone through alcohol withdrawal before but denies any previous alcohol withdrawal seizures.  Patient received multiple doses of lorazepam in the emergency department.  She endorses she is recently on naltrexone and this did help a lot but thought that her prescription had ran out and so did begin to drink again.  Endorses she would like help with alcohol abstinence.  -[facility] protocol  -Scheduled gabapentin taper  -Daily thiamine, folic acid, multivitamin  -Addiction medicine consulted  -Social work consulted    Sinus tachycardia  -Patient with persistent heart rate in the 120s.  Likely secondary to acute alcohol withdrawal.  EKG with sinus tachycardia.  Patient without chest pain, hypoxia or shortness of breath so doubt PE.  -Treat alcohol withdrawal  -Gentle IVF    Acute hepatic dysfunction  -Patient with mildly  elevated bilirubin, AST but normal ALT.  This is likely secondary to acute alcohol binge drinking.  Recent hepatic functions have appeared similar    Mood disorder  -Escitalopram 10 mg p.o. daily    Tobacco use disorder  -Patient endorses smoking 2 to 3 cigarettes daily, mainly when she drinking.  -Denied nicotine replacement    Hypomagnesemia  -Magnesium replacement protocol    Clinically Significant Risk Factors Present on Admission           # Hypomagnesemia: Mg = 1.5 mg/dL (Ref range: 1.8 - 2.6 mg/dL) on admission, will replace as needed  # Anion Gap Metabolic Acidosis: AG = 17 mmol/L (Ref range: 5 - 18 mmol/L) on admission, will monitor and treat as appropriate             Electrolytes: magnesium 1.5  Fluids: NS @ 75 ml/hr  Diet: regular  VTE prophylaxis: SCD, ambulate      COVID19 symptom check 4/1/2022  Fevers/Chills/myalgias: NEGATIVE TO ALL  Sick contacts/COVID19 exposure: NEGATIVE TO ALL  Cough/trouble breathing/SOB/sore throat: NEGATIVE TO ALL  Diarrhea: NEGATIVE      Chief Complaint: Alcohol withdrawal symptoms     HPI:    Diana Love is a 32 year old old female with alcohol use disorder, mood disorder presents with acute alcohol withdrawal.  Patient endorses for the past 3 days she has been drinking approximately 0.5 L of vodka on a daily basis.  Her last drink was the evening of 3/31.  She endorsed she woke on 4/1 anxious, nauseated, tremulous and feeling symptoms of alcohol withdrawal.  Patient endorses she tends to binge drink and that she had actually been on naltrexone for the past few months and doing well with this and had not drink at all.  She thought that she would be able to drink 1-2 drinks off of naltrexone but endorsed that she could not slow down.  She endorses that she is gone through alcohol withdrawal before but has never experienced alcohol withdrawal seizure.  Patient would like to be able to stop drinking and is interested in resuming naltrexone when safe.  Currently still  feeling a little bit anxious but denies any chest pain, cough, shortness of breath, nausea, vomiting.  Has endorse some loose stools that started 4/11 but no fevers, chills.    History is provided by patient    Medical History  Present on Admission:    Sinus tachycardia    Alcohol withdrawal syndrome without complication (H)    Alcohol use disorder, severe, dependence (H)    Hypomagnesemia     No past medical history on file.  Patient Active Problem List   Diagnosis     Alcohol dependence with withdrawal, unspecified (H)     Alcohol use disorder, severe, dependence (H)     Alcohol withdrawal delirium, acute, hyperactive (H)     Alcohol withdrawal syndrome with perceptual disturbance (H)     Dehydration     Anxiety and depression     Mood disorder (H)     Fetal demise     History of alcohol abuse     Hypomagnesemia     Hyponatremia     Metabolic acidosis     Sinus tachycardia     Alcohol withdrawal syndrome without complication (H)     Hepatic dysfunction     Tobacco use disorder     Surgical History  She  has no past surgical history on file.   No past surgical history on file. Social History  Reviewed, and she  reports that she has been smoking cigarettes. She has been smoking about 0.13 packs per day. She does not have any smokeless tobacco history on file. She reports current alcohol use. She reports current drug use. Drug: Marijuana.  Social History     Tobacco Use     Smoking status: Current Every Day Smoker     Packs/day: 0.13     Types: Cigarettes     Smokeless tobacco: Not on file   Substance Use Topics     Alcohol use: Yes     Comment: binge drinking, up to 0.5L per day vodka      Allergies  No Known Allergies Family History  Reviewed, and family history is not on file.  No significant family history Psychosocial Needs  Social History     Social History Narrative     Not on file     Additional psychosocial needs reviewed per nursing assessment.       Prior to Admission Medications   Medications Prior to  "Admission   Medication Sig Dispense Refill Last Dose     escitalopram (LEXAPRO) 10 MG tablet Take 10 mg by mouth daily   3/31/2022 at Unknown time     hydrOXYzine (VISTARIL) 25 MG capsule Take 25 mg by mouth 3 times daily as needed for anxiety        multivitamin w/minerals (THERA-VIT-M) tablet Take 1 tablet by mouth daily   Past Week at Unknown time     [DISCONTINUED] naltrexone (DEPADE/REVIA) 50 MG tablet Take 50 mg by mouth daily   3/27/2022             Review of Systems: History obtained from the patient  General ROS: negative  for  - chills, fatigue, fever  ENT ROS: negative for - headaches, hearing change, nasal congestion, nasal discharge  Hematological and Lymphatic ROS: negative for - bleeding problems, blood clots, bruising  Respiratory ROS: negative for - cough, pleuritic pain, shortness of breath, sputum changes  Cardiovascular ROS: negative for - chest pain, dyspnea on exertion, edema  Gastrointestinal ROS: negative for - abdominal pain, constipation, diarrhea, and nausea/vomiting  Genito-Urinary ROS: negative for - dysuria, hematuria  Musculoskeletal ROS: negative for - gait disturbance,  muscle pain  Neurological ROS: negative for - behavioral changes, confusion, dizziness, positive for anxiety and some tingling in her fingertips  Dermatological ROS: negative for pruritus, rash    /77 (BP Location: Left arm)   Pulse 109   Temp 99  F (37.2  C) (Oral)   Resp 18   Ht 1.626 m (5' 4\")   Wt 56.2 kg (124 lb)   LMP 03/29/2022   SpO2 99%   BMI 21.28 kg/m      Physical Examination:   General appearance - alert, well appearing, and in no distress and oriented to person, place, and time  Mental status - alert, oriented to person, place, and time, normal mood, behavior, speech, dress, motor activity, and thought processes  HEENT - sclera anicteric, left eye normal, right eye normal, nares normal and patent, no erythema  Lymphatics - no palpable lymphadenopathy, no hepatosplenomegaly  Respiratory - " clear to auscultation, no wheezes, rales or rhonchi, symmetric air entry, no tachypnea, retractions or cyanosis  Cardiac -tachycardic, regular rhythm, normal S1, S2, no murmurs, rubs, clicks or gallops, no JVD  Abdomen - soft, nontender, nondistended, no masses or organomegaly no rebound tenderness noted bowel sounds normal, no bladder distension noted  Neurological - alert, oriented, normal speech, no focal findings or movement disorder noted  Musculoskeletal - no joint tenderness, deformity or swelling, full range of motion without pain  Extremities - peripheral pulses normal, no pedal edema, no clubbing or cyanosis  Skin - normal coloration and turgor, no rashes, no suspicious skin lesions noted    Results:  Recent Results (from the past 24 hour(s))   CBC (+ platelets, no diff)    Collection Time: 04/01/22  9:05 AM   Result Value Ref Range    WBC Count 10.8 4.0 - 11.0 10e3/uL    RBC Count 4.52 3.80 - 5.20 10e6/uL    Hemoglobin 14.4 11.7 - 15.7 g/dL    Hematocrit 41.8 35.0 - 47.0 %    MCV 93 78 - 100 fL    MCH 31.9 26.5 - 33.0 pg    MCHC 34.4 31.5 - 36.5 g/dL    RDW 12.6 10.0 - 15.0 %    Platelet Count 299 150 - 450 10e3/uL   Comprehensive metabolic panel    Collection Time: 04/01/22  9:05 AM   Result Value Ref Range    Sodium 140 136 - 145 mmol/L    Potassium 3.9 3.5 - 5.0 mmol/L    Chloride 102 98 - 107 mmol/L    Carbon Dioxide (CO2) 21 (L) 22 - 31 mmol/L    Anion Gap 17 5 - 18 mmol/L    Urea Nitrogen 9 8 - 22 mg/dL    Creatinine 0.74 0.60 - 1.10 mg/dL    Calcium 9.4 8.5 - 10.5 mg/dL    Glucose 88 70 - 125 mg/dL    Alkaline Phosphatase 72 45 - 120 U/L    AST 41 (H) 0 - 40 U/L    ALT 16 0 - 45 U/L    Protein Total 7.6 6.0 - 8.0 g/dL    Albumin 4.5 3.5 - 5.0 g/dL    Bilirubin Total 1.9 (H) 0.0 - 1.0 mg/dL    GFR Estimate >90 >60 mL/min/1.73m2   Lipase    Collection Time: 04/01/22  9:05 AM   Result Value Ref Range    Lipase 40 0 - 52 U/L   Magnesium    Collection Time: 04/01/22  9:05 AM   Result Value Ref Range     Magnesium 1.5 (L) 1.8 - 2.6 mg/dL   ECG 12-LEAD WITH MUSE (LHE)    Collection Time: 04/01/22  9:13 AM   Result Value Ref Range    Systolic Blood Pressure  mmHg    Diastolic Blood Pressure  mmHg    Ventricular Rate 110 BPM    Atrial Rate 110 BPM    DC Interval 128 ms    QRS Duration 88 ms     ms    QTc 489 ms    P Axis 74 degrees    R AXIS 90 degrees    T Axis 67 degrees    Interpretation ECG       Sinus tachycardia  Rightward axis  Borderline ECG  No previous ECGs available  Confirmed by SEE ED PROVIDER NOTE FOR, ECG INTERPRETATION ([id number]),  GARETH AVILES ([id number]) on 4/1/2022 2:35:55 PM     Extra Blue Top Tube    Collection Time: 04/01/22  9:41 AM   Result Value Ref Range    Hold Specimen JIC    Extra Red Top Tube    Collection Time: 04/01/22  9:41 AM   Result Value Ref Range    Hold Specimen JIC    Asymptomatic COVID-19 Virus (Coronavirus) by PCR Nasopharyngeal    Collection Time: 04/01/22 12:04 PM    Specimen: Nasopharyngeal; Swab   Result Value Ref Range    SARS CoV2 PCR Negative Negative         Lab Results personally reviewed 4/1  Imaging Results personally reviewed 4/1  Discussed with patient  Reviewed old records patient  Discharge Planning discussed with patient  Discussed care with patient for 70 minutes with greater than 50% of time spent in counseling and coordination of care.    Cece Calhoun DO  Hospitalist Service  [facility]  Text page via [email or web address] Paging/Directory     This note was created using dragon dictation, any spelling and grammatical errors are unintentional.

## 2022-04-01 NOTE — PHARMACY-ADMISSION MEDICATION HISTORY
Pharmacy Note - Admission Medication History    Pertinent Provider Information:     Patient would like additional refills on her naltrexone prescription.     ______________________________________________________________________    Prior To Admission (PTA) med list completed and updated in EMR.       PTA Med List   Medication Sig Last Dose     escitalopram (LEXAPRO) 10 MG tablet Take 10 mg by mouth daily 3/31/2022 at Unknown time     hydrOXYzine (VISTARIL) 25 MG capsule Take 25 mg by mouth 3 times daily as needed for anxiety      multivitamin w/minerals (THERA-VIT-M) tablet Take 1 tablet by mouth daily Past Week at Unknown time     naltrexone (DEPADE/REVIA) 50 MG tablet Take 50 mg by mouth daily 3/27/2022       Information source(s): Patient and Clinic records  Method of interview communication: in-person    Summary of Changes to PTA Med List  New: none  Discontinued: none  Changed: none    Patient was asked about OTC/herbal products specifically.  PTA med list reflects this.    In the past week, patient estimated taking medication this percent of the time:  50-90% due to illness.    Allergies were reviewed, assessed, and updated with the patient.      Patient did not bring any medications to the hospital and can't retrieve from home. No multi-dose medications are available for use during hospital stay.     The information provided in this note is only as accurate as the sources available at the time of the update(s).    Thank you for the opportunity to participate in the care of this patient.    Doroteo Haines RPH  4/1/2022 12:42 PM

## 2022-04-01 NOTE — ED NOTES
Bed: JN-  Expected date:   Expected time:   Means of arrival: Ambulance  Comments:  WBL: ETOH withdrawal

## 2022-04-02 VITALS
OXYGEN SATURATION: 97 % | SYSTOLIC BLOOD PRESSURE: 129 MMHG | DIASTOLIC BLOOD PRESSURE: 84 MMHG | BODY MASS INDEX: 21.17 KG/M2 | RESPIRATION RATE: 18 BRPM | HEIGHT: 64 IN | TEMPERATURE: 98.6 F | WEIGHT: 124 LBS | HEART RATE: 89 BPM

## 2022-04-02 LAB
ALBUMIN SERPL-MCNC: 3.9 G/DL (ref 3.5–5)
ALP SERPL-CCNC: 68 U/L (ref 45–120)
ALT SERPL W P-5'-P-CCNC: 12 U/L (ref 0–45)
AST SERPL W P-5'-P-CCNC: 34 U/L (ref 0–40)
BILIRUB DIRECT SERPL-MCNC: 0.6 MG/DL
BILIRUB SERPL-MCNC: 2.1 MG/DL (ref 0–1)
MAGNESIUM SERPL-MCNC: 1.7 MG/DL (ref 1.8–2.6)
PROT SERPL-MCNC: 6.8 G/DL (ref 6–8)

## 2022-04-02 PROCEDURE — 36415 COLL VENOUS BLD VENIPUNCTURE: CPT | Performed by: FAMILY MEDICINE

## 2022-04-02 PROCEDURE — 82040 ASSAY OF SERUM ALBUMIN: CPT | Performed by: FAMILY MEDICINE

## 2022-04-02 PROCEDURE — 250N000013 HC RX MED GY IP 250 OP 250 PS 637: Performed by: FAMILY MEDICINE

## 2022-04-02 PROCEDURE — 96361 HYDRATE IV INFUSION ADD-ON: CPT

## 2022-04-02 PROCEDURE — 99217 PR OBSERVATION CARE DISCHARGE: CPT | Performed by: FAMILY MEDICINE

## 2022-04-02 PROCEDURE — 83735 ASSAY OF MAGNESIUM: CPT | Performed by: FAMILY MEDICINE

## 2022-04-02 PROCEDURE — G0378 HOSPITAL OBSERVATION PER HR: HCPCS

## 2022-04-02 RX ORDER — LANOLIN ALCOHOL/MO/W.PET/CERES
100 CREAM (GRAM) TOPICAL DAILY
COMMUNITY
Start: 2022-04-03

## 2022-04-02 RX ORDER — FOLIC ACID 1 MG/1
1 TABLET ORAL DAILY
Qty: 30 TABLET | Refills: 0 | COMMUNITY
Start: 2022-04-03 | End: 2022-05-03

## 2022-04-02 RX ADMIN — Medication 100 MG: at 10:01

## 2022-04-02 RX ADMIN — MULTIPLE VITAMINS W/ MINERALS TAB 1 TABLET: TAB at 10:01

## 2022-04-02 RX ADMIN — GABAPENTIN 600 MG: 300 CAPSULE ORAL at 13:07

## 2022-04-02 RX ADMIN — GABAPENTIN 600 MG: 300 CAPSULE ORAL at 10:02

## 2022-04-02 RX ADMIN — FOLIC ACID 1 MG: 1 TABLET ORAL at 10:02

## 2022-04-02 RX ADMIN — ESCITALOPRAM OXALATE 10 MG: 10 TABLET, FILM COATED ORAL at 10:02

## 2022-04-02 ASSESSMENT — ACTIVITIES OF DAILY LIVING (ADL): DEPENDENT_IADLS:: INDEPENDENT

## 2022-04-02 NOTE — DISCHARGE SUMMARY
[facility] MEDICINE  DISCHARGE SUMMARY  [facility]     Primary Care Physician: No Ref-Primary, Physician  Admission Date: 4/1/2022   Discharge Provider: Cece Calhoun DO Discharge Date: 4/2/2022   Diet: regular   Code Status: Full Code   Activity:  As tolerated        Condition at Discharge: stable      REASON FOR PRESENTATION(See Admission Note for Details)   32-year-old female with alcohol use disorder, mood disorder presents with acute alcohol withdrawal, tachycardia, hypomagnesemia.    PRINCIPAL & ACTIVE DISCHARGE DIAGNOSES     Active Problems:    Alcohol use disorder, severe, dependence (H)    Mood disorder (H)    Hypomagnesemia    Sinus tachycardia    Alcohol withdrawal syndrome without complication (H)    Hepatic dysfunction    Tobacco use disorder      SIGNIFICANT FINDINGS (Imaging, labs):       PENDING LABS     [unfilled]      PROCEDURES ( this hospitalization only)          RECOMMENDATIONS TO OUTPATIENT PROVIDER FOR F/U VISIT     Follow-up with primary care within 1 week  Follow-up hepatic functions at that time to ensure bilirubin normalizing.  Patient to continue addiction medicine through [facility].    DISPOSITION     Home    SUMMARY OF HOSPITAL COURSE:      Alcohol abuse disorder with acute alcohol withdrawal    Patient endorsed that she has been drinking 1/2 L of vodka on a daily basis for the past 3 days prior to admission.  Last drink was the evening of 3/31 and she had been feeling anxious, tremulous, nauseated on 4/1.  Patient has gone through alcohol withdrawal before but denies any previous alcohol withdrawal seizures.  Patient endorsed she was recently on naltrexone and this did help a lot but she thought that her prescription had worn out and so she has not taken it recently and did begin to drink again.  Patient did receive a few doses of lorazepam with scheduled gabapentin but experienced no further symptoms of withdrawal on 4/2.   Patient to resume naltrexone at noon on 4/3/2022, follow-up with [facility] addiction medicine and to continue outpatient gabapentin taper.  Rule 25 completed while inpatient     Sinus tachycardia- resolved  Patient with persistent heart rate in the 120s.  Likely secondary to acute alcohol withdrawal.  EKG with sinus tachycardia.  Patient without chest pain, hypoxia or shortness of breath so doubt PE.  Resolved with treatment of withdrawal and gentle IV fluids.     Acute hepatic dysfunction  Patient with mildly elevated bilirubin, AST but normal ALT.  This is likely secondary to acute alcohol binge drinking.  Recent hepatic functions have appeared similar.  Would repeat again within 1 week to ensure normalizing     Mood disorder  Continue home Escitalopram 10 mg p.o. daily     Tobacco use disorder  Patient endorses smoking 2 to 3 cigarettes daily, mainly when she drinking.  Declined nicotine replacement     Hypomagnesemia  Magnesium replacement protocol    Discharge Medications with Med changes:        Review of your medicines      START taking      Dose / Directions   folic acid 1 MG tablet  Commonly known as: FOLVITE      Dose: 1 mg  Start taking on: April 3, 2022  Take 1 tablet (1 mg) by mouth daily  Quantity: 30 tablet  Refills: 0     gabapentin 300 MG capsule  Commonly known as: NEURONTIN      Dose: 600 mg  Take 2 capsules (600 mg) by mouth 3 times daily  Quantity: 180 capsule  Refills: 0     thiamine 100 MG tablet  Commonly known as: B-1      Dose: 100 mg  Start taking on: April 3, 2022  Take 1 tablet (100 mg) by mouth daily  Refills: 0        CONTINUE these medicines which have NOT CHANGED      Dose / Directions   escitalopram 10 MG tablet  Commonly known as: LEXAPRO      Dose: 10 mg  Take 10 mg by mouth daily  Refills: 0     hydrOXYzine 25 MG capsule  Commonly known as: VISTARIL      Dose: 25 mg  Take 25 mg by mouth 3 times daily as needed for anxiety  Refills: 0     multivitamin w/minerals tablet       Dose: 1 tablet  Take 1 tablet by mouth daily  Refills: 0     naltrexone 50 MG tablet  Commonly known as: DEPADE/REVIA      Dose: 50 mg  Take 1 tablet (50 mg) by mouth daily  Quantity: 30 tablet  Refills: 0           Where to get your medicines      These medications were sent to [facility] [address]  [address], [place] [address]    Phone: [phone]     gabapentin 300 MG capsule    naltrexone 50 MG tablet     Some of these will need a paper prescription and others can be bought over the counter. Ask your nurse if you have questions.    You don't need a prescription for these medications    folic acid 1 MG tablet    thiamine 100 MG tablet             Rationale for medication changes:      Naltrexone resumed        Consults   Addiction medicine      Immunizations given this encounter     [unfilled]       Anticoagulation Information      Recent INR results: No results for input(s): INR in the last 168 hours.      Discharge Orders     Discharge Procedure Orders   Adult Mental Health  Referral   Standing Status: Future   Referral Priority: Routine: Next available opening Referral Type: Mental Health Outpatient   Number of Visits Requested: 1     Reason for your hospital stay   Order Comments: Acute alcohol withdrawal     Follow-up and recommended labs and tests    Order Comments: Follow up with primary care provider, Physician No Ref-Primary, within 7 days for hospital follow- up.  No follow up labs or test are needed.  Will follow up with addiction medicine at [facility]  RN discharge follow up clinic visit ([facility] clinics only) within 7 days.     Activity   Order Comments: Your activity upon discharge: activity as tolerated     Order Specific Question Answer Comments   Is discharge order? Yes      Diet   Order Comments: Follow this diet upon discharge: Orders Placed This Encounter      Regular Diet Adult     Order  "Specific Question Answer Comments   Is discharge order? Yes      Examination     Vital signs:  Temp: 98.2  F (36.8  C) Temp src: Oral BP: 119/78 Pulse: 84   Resp: 18 SpO2: 98 % O2 Device: None (Room air)   Height: 162.6 cm (5' 4\") Weight: 56.2 kg (124 lb)  Estimated body mass index is 21.28 kg/m  as calculated from the following:    Height as of this encounter: 1.626 m (5' 4\").    Weight as of this encounter: 56.2 kg (124 lb).         Physical Examination:   General appearance - alert, well appearing, and in no distress and oriented to person, place, and time  Mental status - alert, oriented to person, place, and time, normal mood, behavior, speech, dress, motor activity, and thought processes  HEENT - sclera anicteric, left eye normal, right eye normal, nares normal and patent, no erythema  Lymphatics - no palpable lymphadenopathy, no hepatosplenomegaly  Respiratory - clear to auscultation, no wheezes, rales or rhonchi, symmetric air entry, no tachypnea, retractions or cyanosis  Cardiac -regular rate, regular rhythm, normal S1, S2, no murmurs, rubs, clicks or gallops, no JVD  Abdomen - soft, nontender, nondistended, no masses or organomegaly no rebound tenderness noted bowel sounds normal, no bladder distension noted  Neurological - alert, oriented, normal speech, no focal findings or movement disorder noted  Musculoskeletal - no joint tenderness, deformity or swelling, full range of motion without pain  Extremities - peripheral pulses normal, no pedal edema, no clubbing or cyanosis  Skin - normal coloration and turgor, no rashes, no suspicious skin lesions noted      Please see EMR for more detailed significant labs, imaging, consultant notes etc.  Total time spent on discharge: 50 minutes    Cece Calhoun DO    [facility]: Ph:[phone]    CC:No Ref-Primary, Physician    "

## 2022-04-02 NOTE — PLAN OF CARE
"PRIMARY DIAGNOSIS: \"GENERIC\" NURSING  OUTPATIENT/OBSERVATION GOALS TO BE MET BEFORE DISCHARGE:  ADLs back to baseline: Yes    Activity and level of assistance: Ambulating independently.    Pain status: Pain free.    Return to near baseline physical activity: Yes     Discharge Planner Nurse   Safe discharge environment identified: Yes  Barriers to discharge: Yes         Entered by: Janice Rosenthal 04/01/2022 10:19 PM     Please review provider order for any additional goals.   Nurse to notify provider when observation goals have been met and patient is ready for discharge.                        "

## 2022-04-02 NOTE — PLAN OF CARE
Problem: Plan of Care - These are the overarching goals to be used throughout the patient stay.    Goal: Optimal Comfort and Wellbeing  Outcome: Ongoing, Progressing     Problem: Plan of Care - These are the overarching goals to be used throughout the patient stay.    Goal: Readiness for Transition of Care  Outcome: Ongoing, Progressing   Goal Outcome Evaluation:      Pt scored 0 CIWA. Pt denies pain. Limited appetite noted at breakfast. Pt tolerated scheduled medications. No other concerns noted.  Andreas Mir RN  4/2/2022.

## 2022-04-02 NOTE — PROGRESS NOTES
"PRIMARY DIAGNOSIS: \"GENERIC\" NURSING    OUTPATIENT/OBSERVATION GOALS TO BE MET BEFORE DISCHARGE:  1. ADLs back to baseline: Yes    2. Activity and level of assistance: Ambulating independently.    3. Pain status: Pain free.    4. Return to near baseline physical activity: Yes     Discharge Planner Nurse   Safe discharge environment identified: Yes  Barriers to discharge: Yes       Entered by: Gabriel Adame 04/02/2022 12:04 AM     Please review provider order for any additional goals.   Nurse to notify provider when observation goals have been met and patient is ready for discharge.    "

## 2022-04-02 NOTE — PROGRESS NOTES
"PRIMARY DIAGNOSIS: \"GENERIC\" NURSING  OUTPATIENT/OBSERVATION GOALS TO BE MET BEFORE DISCHARGE:  1. ADLs back to baseline: Yes    2. Activity and level of assistance: Ambulating independently.    3. Pain status: Pain free.    4. Return to near baseline physical activity: Yes     Discharge Planner Nurse   Safe discharge environment identified: Yes  Barriers to discharge: Yes       Entered by: Gabriel Adame 04/02/2022 4:33 AM     Please review provider order for any additional goals.   Nurse to notify provider when observation goals have been met and patient is ready for discharge.  "

## 2022-04-02 NOTE — PROGRESS NOTES
Care Management Initial Consult    General Information  Assessment completed with: Patient,    Type of CM/SW Visit: Initial Assessment    Primary Care Provider verified and updated as needed: Yes (Does not have a PCP)   Readmission within the last 30 days: no previous admission in last 30 days      Reason for Consult: discharge planning  Advance Care Planning:            Communication Assessment  Patient's communication style: spoken language (English or Bilingual)    Hearing Difficulty or Deaf: no   Wear Glasses or Blind: yes    Cognitive  Cognitive/Neuro/Behavioral: WDL  Level of Consciousness: alert  Arousal Level: opens eyes spontaneously  Orientation: oriented x 4  Mood/Behavior: calm, cooperative  Best Language: 0 - No aphasia  Speech: hyperverbal/rapid    Living Environment:   People in home: parent(s)     Current living Arrangements: house      Able to return to prior arrangements: yes       Family/Social Support:  Care provided by: self  Provides care for: no one                Description of Support System: Adequate support from family       Current Resources:   Patient receiving home care services: No     Community Resources: None  Equipment currently used at home: none  Supplies currently used at home: None    Employment/Financial:  Employment Status: employed full-time        Financial Concerns: No concerns identified           Lifestyle & Psychosocial Needs:  Social Determinants of Health     Tobacco Use: High Risk     Smoking Tobacco Use: Current Every Day Smoker     Smokeless Tobacco Use: Unknown   Alcohol Use: Not on file   Financial Resource Strain: Not on file   Food Insecurity: Not on file   Transportation Needs: Not on file   Physical Activity: Not on file   Stress: Not on file   Social Connections: Not on file   Intimate Partner Violence: Not on file   Depression: Not on file   Housing Stability: Not on file       Functional Status:  Prior to admission patient needed assistance:   Dependent  ADLs:: Independent  Dependent IADLs:: Independent  Assesssment of Functional Status: At functional baseline    Mental Health Status:  Mental Health Status: Current Concern  Mental Health Management: Other (see comment) (Pt is accepting to seek treatment)    Chemical Dependency Status:  Chemical Dependency Status: Current Concern  Chemical Dependency Management: Previous treatment, AA          Values/Beliefs:  Spiritual, Cultural Beliefs, Jehovah's witness Practices, Values that affect care: yes       Cultural/Jehovah's witness Practices Patient Routinely Participates In: prayer         Care Management Discharge Note    Discharge Date: 04/02/2022       Discharge Disposition: Home    Discharge Services: Chemical Dependency Resources    Discharge DME: None    Discharge Transportation: car, drives self    PAS Confirmation Code:    Patient/family educated on Medicare website which has current facility and service quality ratings:      Education Provided on the Discharge Plan: Yes   Persons Notified of Discharge Plans: Pt  Patient/Family in Agreement with the Plan: yes      Additional Information:  Assessed. Pt report that she recently moved in with her mother and report adequately family support from mother and brother. She report a family hx of chemical dependency. That her mother has been sober for 25yrs and brother sober for 2yrs after 12yrs of substance used.     Pt report ongoing challenge with alcohol used and mental health for about 2 and half years. Was charged with DWI and committed to receive treatment in June of 2020. That she completed outpatient 12weeks  treatment with [facility]. That it was helpful and she was able to stay sober for 3 months before relapsing.     She express interest in getting longer outpatient treatment as opposed to inpatient. Provide pt with recovery resources and support and pt is agreeable to follow-up on resources. No further concern at this time.       Renetta Alcantara, EPIFANIO  04/02/22

## 2022-04-02 NOTE — PLAN OF CARE
Problem: Plan of Care - These are the overarching goals to be used throughout the patient stay.    Goal: Optimal Comfort and Wellbeing  4/2/2022 1440 by Andreas Mir, RN  Outcome: Adequate for Care Transition  4/2/2022 1437 by Andreas Mir, RN  Outcome: Ongoing, Progressing   Goal Outcome Evaluation:      Pt scored 0 on her second CIWA test. Pt denies pain and shows no signs or symptoms of ETOH withdrawal. Heart rhythm normal sinus. Provider notified. Awaiting follow up from  to see if Pt is agreeable to seeking outpatient treatment. No other concerns noted.   Andreas Mir, OSMANY  4/2/2022  2:48 PM

## 2022-04-03 NOTE — PLAN OF CARE
Problem: Plan of Care - These are the overarching goals to be used throughout the patient stay.    Goal: Optimal Comfort and Wellbeing  Outcome: Adequate for Care Transition     Problem: Plan of Care - These are the overarching goals to be used throughout the patient stay.    Goal: Readiness for Transition of Care  Outcome: Adequate for Care Transition   Goal Outcome Evaluation:      Pt is agreeable to outpatient therapy for alcohol abuse. Pt discharged home via personal vehicle driven by self.

## 2022-04-04 ENCOUNTER — PATIENT OUTREACH (OUTPATIENT)
Dept: CARE COORDINATION | Facility: CLINIC | Age: 32
End: 2022-04-04
Payer: COMMERCIAL

## 2022-04-04 DIAGNOSIS — Z71.89 OTHER SPECIFIED COUNSELING: ICD-10-CM

## 2022-04-04 NOTE — PROGRESS NOTES
Clinic Care Coordination Contact  [facility]       Clinical Data: Care Coordinator Outreach  Outreach attempted x 1.  Left message on patient's voicemail with call back information and requested return call.  Care Coordinator will try to reach patient again in 1-2 business days.    Kathya Ward  [phone]  Care     Clinic Care Coordination Contact  [facility]/Voicemail       Clinical Data: Care Coordinator Outreach  Outreach attempted x 2.  Left message on patient's voicemail with call back information and requested return call.  Care Coordinator will do no further outreaches at this time.      Kathya Ward  [phone]  Care